# Patient Record
Sex: FEMALE | Race: WHITE | Employment: OTHER | URBAN - METROPOLITAN AREA
[De-identification: names, ages, dates, MRNs, and addresses within clinical notes are randomized per-mention and may not be internally consistent; named-entity substitution may affect disease eponyms.]

---

## 2021-03-02 DIAGNOSIS — Z11.59 ENCOUNTER FOR SCREENING FOR OTHER VIRAL DISEASES: ICD-10-CM

## 2021-03-16 ENCOUNTER — ANESTHESIA EVENT (OUTPATIENT)
Dept: SURGERY | Facility: CLINIC | Age: 76
End: 2021-03-16
Payer: MEDICARE

## 2021-03-17 RX ORDER — SERTRALINE HYDROCHLORIDE 100 MG/1
100 TABLET, FILM COATED ORAL DAILY
COMMUNITY

## 2021-03-17 RX ORDER — OXYBUTYNIN CHLORIDE 10 MG/1
10 TABLET, EXTENDED RELEASE ORAL DAILY
COMMUNITY

## 2021-03-17 RX ORDER — ASCORBIC ACID 500 MG
500 TABLET ORAL DAILY
COMMUNITY

## 2021-03-17 RX ORDER — ERGOCALCIFEROL (VITAMIN D2) 10 MCG
2 TABLET ORAL DAILY
COMMUNITY

## 2021-03-17 RX ORDER — METOPROLOL TARTRATE 25 MG/1
25 TABLET, FILM COATED ORAL 2 TIMES DAILY
COMMUNITY

## 2021-03-17 RX ORDER — SIMVASTATIN 40 MG
40 TABLET ORAL AT BEDTIME
COMMUNITY

## 2021-03-17 RX ORDER — LOSARTAN POTASSIUM 50 MG/1
50 TABLET ORAL DAILY
COMMUNITY

## 2021-03-17 RX ORDER — LACTOBACILLUS RHAMNOSUS GG 10B CELL
1 CAPSULE ORAL DAILY
COMMUNITY

## 2021-03-17 RX ORDER — WARFARIN SODIUM 4 MG/1
4 TABLET ORAL
COMMUNITY

## 2021-03-17 RX ORDER — AMOXICILLIN 500 MG/1
2000 TABLET, FILM COATED ORAL PRN
COMMUNITY

## 2021-03-17 RX ORDER — LOPERAMIDE HYDROCHLORIDE 2 MG/1
2 TABLET ORAL EVERY 4 HOURS PRN
COMMUNITY

## 2021-03-17 RX ORDER — NYSTATIN 100000 [USP'U]/G
POWDER TOPICAL PRN
COMMUNITY

## 2021-03-18 ENCOUNTER — HOSPITAL ENCOUNTER (OUTPATIENT)
Facility: CLINIC | Age: 76
Discharge: HOME | End: 2021-03-18
Attending: OPHTHALMOLOGY | Admitting: OPHTHALMOLOGY
Payer: MEDICARE

## 2021-03-18 ENCOUNTER — ANESTHESIA (OUTPATIENT)
Dept: SURGERY | Facility: CLINIC | Age: 76
End: 2021-03-18
Payer: MEDICARE

## 2021-03-18 VITALS
SYSTOLIC BLOOD PRESSURE: 139 MMHG | DIASTOLIC BLOOD PRESSURE: 67 MMHG | OXYGEN SATURATION: 94 % | RESPIRATION RATE: 13 BRPM | HEIGHT: 65 IN | HEART RATE: 56 BPM | TEMPERATURE: 98 F | WEIGHT: 269.4 LBS | BODY MASS INDEX: 44.89 KG/M2

## 2021-03-18 DIAGNOSIS — H02.831 DERMATOCHALASIS OF BOTH UPPER EYELIDS: Primary | ICD-10-CM

## 2021-03-18 DIAGNOSIS — H02.834 DERMATOCHALASIS OF BOTH UPPER EYELIDS: Primary | ICD-10-CM

## 2021-03-18 LAB — INR PPP: 1.01 (ref 0.86–1.14)

## 2021-03-18 PROCEDURE — 999N000141 HC STATISTIC PRE-PROCEDURE NURSING ASSESSMENT: Performed by: OPHTHALMOLOGY

## 2021-03-18 PROCEDURE — 250N000009 HC RX 250: Performed by: OPHTHALMOLOGY

## 2021-03-18 PROCEDURE — 710N000012 HC RECOVERY PHASE 2, PER MINUTE: Performed by: OPHTHALMOLOGY

## 2021-03-18 PROCEDURE — 250N000011 HC RX IP 250 OP 636: Performed by: NURSE ANESTHETIST, CERTIFIED REGISTERED

## 2021-03-18 PROCEDURE — 250N000009 HC RX 250: Performed by: NURSE ANESTHETIST, CERTIFIED REGISTERED

## 2021-03-18 PROCEDURE — 370N000017 HC ANESTHESIA TECHNICAL FEE, PER MIN: Performed by: OPHTHALMOLOGY

## 2021-03-18 PROCEDURE — 272N000001 HC OR GENERAL SUPPLY STERILE: Performed by: OPHTHALMOLOGY

## 2021-03-18 PROCEDURE — 258N000003 HC RX IP 258 OP 636: Performed by: ANESTHESIOLOGY

## 2021-03-18 PROCEDURE — 360N000076 HC SURGERY LEVEL 3, PER MIN: Performed by: OPHTHALMOLOGY

## 2021-03-18 PROCEDURE — 710N000009 HC RECOVERY PHASE 1, LEVEL 1, PER MIN: Performed by: OPHTHALMOLOGY

## 2021-03-18 PROCEDURE — 258N000003 HC RX IP 258 OP 636: Performed by: NURSE ANESTHETIST, CERTIFIED REGISTERED

## 2021-03-18 PROCEDURE — 85610 PROTHROMBIN TIME: CPT | Performed by: ANESTHESIOLOGY

## 2021-03-18 PROCEDURE — 36415 COLL VENOUS BLD VENIPUNCTURE: CPT | Performed by: ANESTHESIOLOGY

## 2021-03-18 RX ORDER — ONDANSETRON 2 MG/ML
INJECTION INTRAMUSCULAR; INTRAVENOUS PRN
Status: DISCONTINUED | OUTPATIENT
Start: 2021-03-18 | End: 2021-03-18

## 2021-03-18 RX ORDER — ERYTHROMYCIN 5 MG/G
OINTMENT OPHTHALMIC PRN
Status: DISCONTINUED | OUTPATIENT
Start: 2021-03-18 | End: 2021-03-18 | Stop reason: HOSPADM

## 2021-03-18 RX ORDER — NALOXONE HYDROCHLORIDE 0.4 MG/ML
0.4 INJECTION, SOLUTION INTRAMUSCULAR; INTRAVENOUS; SUBCUTANEOUS
Status: DISCONTINUED | OUTPATIENT
Start: 2021-03-18 | End: 2021-03-18 | Stop reason: HOSPADM

## 2021-03-18 RX ORDER — LIDOCAINE HYDROCHLORIDE 20 MG/ML
INJECTION, SOLUTION INFILTRATION; PERINEURAL PRN
Status: DISCONTINUED | OUTPATIENT
Start: 2021-03-18 | End: 2021-03-18

## 2021-03-18 RX ORDER — SODIUM CHLORIDE, SODIUM LACTATE, POTASSIUM CHLORIDE, CALCIUM CHLORIDE 600; 310; 30; 20 MG/100ML; MG/100ML; MG/100ML; MG/100ML
INJECTION, SOLUTION INTRAVENOUS CONTINUOUS
Status: DISCONTINUED | OUTPATIENT
Start: 2021-03-18 | End: 2021-03-18 | Stop reason: HOSPADM

## 2021-03-18 RX ORDER — MEPERIDINE HYDROCHLORIDE 25 MG/ML
12.5 INJECTION INTRAMUSCULAR; INTRAVENOUS; SUBCUTANEOUS
Status: DISCONTINUED | OUTPATIENT
Start: 2021-03-18 | End: 2021-03-18 | Stop reason: HOSPADM

## 2021-03-18 RX ORDER — ONDANSETRON 4 MG/1
4 TABLET, ORALLY DISINTEGRATING ORAL EVERY 30 MIN PRN
Status: DISCONTINUED | OUTPATIENT
Start: 2021-03-18 | End: 2021-03-18 | Stop reason: HOSPADM

## 2021-03-18 RX ORDER — HYDROMORPHONE HYDROCHLORIDE 1 MG/ML
.3-.5 INJECTION, SOLUTION INTRAMUSCULAR; INTRAVENOUS; SUBCUTANEOUS EVERY 10 MIN PRN
Status: DISCONTINUED | OUTPATIENT
Start: 2021-03-18 | End: 2021-03-18 | Stop reason: HOSPADM

## 2021-03-18 RX ORDER — ALBUTEROL SULFATE 0.83 MG/ML
2.5 SOLUTION RESPIRATORY (INHALATION) EVERY 4 HOURS PRN
Status: DISCONTINUED | OUTPATIENT
Start: 2021-03-18 | End: 2021-03-18 | Stop reason: HOSPADM

## 2021-03-18 RX ORDER — FENTANYL CITRATE 50 UG/ML
25-50 INJECTION, SOLUTION INTRAMUSCULAR; INTRAVENOUS
Status: CANCELLED | OUTPATIENT
Start: 2021-03-18

## 2021-03-18 RX ORDER — BUPIVACAINE HYDROCHLORIDE AND EPINEPHRINE 5; 5 MG/ML; UG/ML
INJECTION, SOLUTION EPIDURAL; INTRACAUDAL; PERINEURAL
Status: DISCONTINUED
Start: 2021-03-18 | End: 2021-03-18 | Stop reason: HOSPADM

## 2021-03-18 RX ORDER — ASPIRIN 81 MG/1
81 TABLET ORAL DAILY
COMMUNITY

## 2021-03-18 RX ORDER — METOPROLOL TARTRATE 1 MG/ML
1-2 INJECTION, SOLUTION INTRAVENOUS EVERY 5 MIN PRN
Status: DISCONTINUED | OUTPATIENT
Start: 2021-03-18 | End: 2021-03-18 | Stop reason: HOSPADM

## 2021-03-18 RX ORDER — PROPOFOL 10 MG/ML
INJECTION, EMULSION INTRAVENOUS PRN
Status: DISCONTINUED | OUTPATIENT
Start: 2021-03-18 | End: 2021-03-18

## 2021-03-18 RX ORDER — OXYCODONE HCL 5 MG/5 ML
5 SOLUTION, ORAL ORAL EVERY 4 HOURS PRN
Status: DISCONTINUED | OUTPATIENT
Start: 2021-03-18 | End: 2021-03-18 | Stop reason: HOSPADM

## 2021-03-18 RX ORDER — FENTANYL CITRATE 50 UG/ML
25-50 INJECTION, SOLUTION INTRAMUSCULAR; INTRAVENOUS
Status: DISCONTINUED | OUTPATIENT
Start: 2021-03-18 | End: 2021-03-18 | Stop reason: HOSPADM

## 2021-03-18 RX ORDER — NALOXONE HYDROCHLORIDE 0.4 MG/ML
0.2 INJECTION, SOLUTION INTRAMUSCULAR; INTRAVENOUS; SUBCUTANEOUS
Status: DISCONTINUED | OUTPATIENT
Start: 2021-03-18 | End: 2021-03-18 | Stop reason: HOSPADM

## 2021-03-18 RX ORDER — TETRACAINE HYDROCHLORIDE 5 MG/ML
SOLUTION OPHTHALMIC PRN
Status: DISCONTINUED | OUTPATIENT
Start: 2021-03-18 | End: 2021-03-18 | Stop reason: HOSPADM

## 2021-03-18 RX ORDER — TETRACAINE HYDROCHLORIDE 5 MG/ML
SOLUTION OPHTHALMIC
Status: DISCONTINUED
Start: 2021-03-18 | End: 2021-03-18 | Stop reason: HOSPADM

## 2021-03-18 RX ORDER — FENTANYL CITRATE 50 UG/ML
INJECTION, SOLUTION INTRAMUSCULAR; INTRAVENOUS PRN
Status: DISCONTINUED | OUTPATIENT
Start: 2021-03-18 | End: 2021-03-18

## 2021-03-18 RX ORDER — HYDRALAZINE HYDROCHLORIDE 20 MG/ML
2.5-5 INJECTION INTRAMUSCULAR; INTRAVENOUS EVERY 10 MIN PRN
Status: DISCONTINUED | OUTPATIENT
Start: 2021-03-18 | End: 2021-03-18 | Stop reason: HOSPADM

## 2021-03-18 RX ORDER — ERYTHROMYCIN 5 MG/G
OINTMENT OPHTHALMIC
Status: DISCONTINUED
Start: 2021-03-18 | End: 2021-03-18 | Stop reason: HOSPADM

## 2021-03-18 RX ORDER — ONDANSETRON 2 MG/ML
4 INJECTION INTRAMUSCULAR; INTRAVENOUS EVERY 30 MIN PRN
Status: DISCONTINUED | OUTPATIENT
Start: 2021-03-18 | End: 2021-03-18 | Stop reason: HOSPADM

## 2021-03-18 RX ADMIN — PROPOFOL 30 MG: 10 INJECTION, EMULSION INTRAVENOUS at 07:55

## 2021-03-18 RX ADMIN — PROPOFOL 20 MG: 10 INJECTION, EMULSION INTRAVENOUS at 07:57

## 2021-03-18 RX ADMIN — FENTANYL CITRATE 25 MCG: 50 INJECTION, SOLUTION INTRAMUSCULAR; INTRAVENOUS at 07:55

## 2021-03-18 RX ADMIN — ONDANSETRON 4 MG: 2 INJECTION INTRAMUSCULAR; INTRAVENOUS at 07:55

## 2021-03-18 RX ADMIN — DEXMEDETOMIDINE HYDROCHLORIDE 8 MCG: 100 INJECTION, SOLUTION INTRAVENOUS at 07:53

## 2021-03-18 RX ADMIN — SODIUM CHLORIDE, POTASSIUM CHLORIDE, SODIUM LACTATE AND CALCIUM CHLORIDE: 600; 310; 30; 20 INJECTION, SOLUTION INTRAVENOUS at 07:47

## 2021-03-18 RX ADMIN — LIDOCAINE HYDROCHLORIDE 40 MG: 20 INJECTION, SOLUTION INFILTRATION; PERINEURAL at 07:55

## 2021-03-18 ASSESSMENT — MIFFLIN-ST. JEOR: SCORE: 1722.87

## 2021-03-18 NOTE — OP NOTE
[facility]   Operative Note    Pre-operative diagnosis: Dermatochalasis of right upper eyelid [H02.831]  Dermatochalasis of left upper eyelid [H02.834]   Post-operative diagnosis Same     Procedure: Procedure(s):  BLEPHAROPLASTY BILATERAL UPPER LIDS   Surgeon(s): Surgeon(s) and Role:     * Femi Lamb MD - Primary   Estimated blood loss: 2 mL    Specimens: ID Type Source Tests Collected by Time Destination   1 : BILATERAL BLEPHAROPLASTY SKIN Tissue Upper Eyelid OR DOCUMENTATION ONLY Femi Lamb MD 3/18/2021  8:11 AM       Findings: None     Description of procedure:     The patient presented with a chief complaint of droopy upper eyelids. On examination, the patient was found to have bilateral upper eyelid dermatochalasis. Risks, benefits, and alternatives were discussed. Potential complications were also discussed. The patient understood and wished to proceed.    After obtaining informed consent the patient was brought to the ambulatory surgery center where the patient was placed on the surgical table in the supine position. Using IV standby local anesthetic was infiltrated. A mixture of 2% lidocaine with epinephrine and 0.5% Marcaine with epinephrine was used. Patient was prepped and draped in the usual sterile fashion.    The procedures were performed in a symmetric fashion and will be described for one side. An incision was made in the upper eyelid with a #15 Bard-Ricky blade. The ellipse was determined using the caliper technique leaving behind approximately 20 mm of tissue. This was marked with a surgical marking pen prior to surgery. Skin and some muscle were removed with the [place] tip needle. All hemostasis was performed with this device during the procedure. The skin was closed with a running 6-0 Plain suture.    The patient's face was cleaned and antibiotic ointment was placed onto the wound. The patient tolerated the procedure well and was taken to the recovery  area in good condition. Following the recovery room protocol, the patient was discharged in the presence of a responsible adult.

## 2021-03-18 NOTE — ANESTHESIA PREPROCEDURE EVALUATION
Anesthesia Pre-Procedure Evaluation    Patient: Irish Payton   MRN: [id number] : 1945        Preoperative Diagnosis: Dermatochalasis of right upper eyelid [H02.831]  Dermatochalasis of left upper eyelid [H02.834]   Procedure : Procedure(s):  BLEPHAROPLASTY BILATERAL UPPER LIDS     Past Medical History:   Diagnosis Date     Breast cancer (H)      CVA (cerebral vascular accident) (H)      Depressive disorder      Gait disorder     r/t stroke     Hemiplegia and hemiparesis following cerebral infarction affecting left dominant side (H)      History of fall      Hyperlipidemia      Incontinence      Presence of inferior vena cava filter      Pulmonary embolism (H)      Sleep apnea       Past Surgical History:   Procedure Laterality Date     BACK SURGERY      disectomy     BREAST SURGERY      breast biopsy     GENITOURINARY SURGERY      repair bladder     ORTHOPEDIC SURGERY      arthroplasty total knee replacement, revision total knee     ORTHOPEDIC SURGERY      ORIF     vena cava filter replacement        Allergies   Allergen Reactions     Hydrocodone GI Disturbance     Hydrocodone-Cpm-Pseudoephed     Lisinopril Cough     Ciprofloxacin Rash     Heparin Rash     Nickel Rash      Social History     Tobacco Use     Smoking status: Not on file   Substance Use Topics     Alcohol use: Not on file      Wt Readings from Last 1 Encounters:   No data found for Wt        Anesthesia Evaluation   Pt has had prior anesthetic.     No history of anesthetic complications       ROS/MED HX  ENT/Pulmonary:     (+) sleep apnea,     Neurologic:     (+) CVA, with deficits, - hemiplegia.     Cardiovascular:       METS/Exercise Tolerance:     Hematologic:     (+) History of blood clots,     Musculoskeletal:       GI/Hepatic:     (+) Inflammatory bowel disease,     Renal/Genitourinary:       Endo:       Psychiatric/Substance Use:       Infectious Disease:       Malignancy:   (+) Malignancy, History of Breast.    Other:                OUTSIDE LABS:  CBC:   Lab Results   Component Value Date    HGB 11.0 (L) 07/22/2010    HGB 11.9 07/21/2010     07/22/2010     BMP:   Lab Results   Component Value Date     07/19/2010    POTASSIUM 4.1 07/19/2010    CHLORIDE 101 07/19/2010    CO2 28 07/19/2010    BUN 16 07/19/2010    CR 1.01 07/19/2010    GLC 78 07/19/2010     COAGS:   Lab Results   Component Value Date    PTT 39 (H) 07/19/2010    INR 1.37 (H) 07/23/2010     POC: No results found for: BGM, HCG, HCGS  HEPATIC: No results found for: ALBUMIN, PROTTOTAL, ALT, AST, GGT, ALKPHOS, BILITOTAL, BILIDIRECT, DORCAS  OTHER:   Lab Results   Component Value Date    LUIZ 9.2 07/19/2010       Anesthesia Plan    ASA Status:  3   NPO Status:  NPO Appropriate    Anesthesia Type: MAC.              Consents    Anesthesia Plan(s) and associated risks, benefits, and realistic alternatives discussed. Questions answered and patient/representative(s) expressed understanding.     - Discussed with:  Patient         Postoperative Care    Pain management: Multi-modal analgesia.   PONV prophylaxis: Ondansetron (or other 5HT-3)     Comments:                Yessica Vincent MD, MD

## 2021-03-18 NOTE — BRIEF OP NOTE
[facility]    Brief Operative Note    Pre-operative diagnosis: Dermatochalasis of right upper eyelid [H02.831]  Dermatochalasis of left upper eyelid [H02.834]  Post-operative diagnosis Same as pre-operative diagnosis    Procedure: Procedure(s):  BLEPHAROPLASTY BILATERAL UPPER LIDS  Surgeon: Surgeon(s) and Role:     * Femi Lamb MD - Primary  Anesthesia: Monitor Anesthesia Care   Estimated blood loss: 2 mL  Drains: None  Specimens:   ID Type Source Tests Collected by Time Destination   1 : BILATERAL BLEPHAROPLASTY SKIN Tissue Upper Eyelid OR DOCUMENTATION ONLY Femi Lamb MD 3/18/2021  8:11 AM      Findings:   None.  Complications: None.  Implants: * No implants in log *

## 2021-03-18 NOTE — DISCHARGE INSTRUCTIONS
YOU MAY RESUME WARFARIN IN 3 DAYS.    Same Day Surgery Discharge Instructions for  Sedation and General Anesthesia       It's not unusual to feel dizzy, light-headed or faint for up to 24 hours after surgery or while taking pain medication.  If you have these symptoms: sit for a few minutes before standing and have someone assist you when you get up to walk or use the bathroom.      You should rest and relax for the next 24 hours. We recommend you make arrangements to have an adult stay with you for at least 24 hours after your discharge.  Avoid hazardous and strenuous activity.      DO NOT DRIVE any vehicle or operate mechanical equipment for 24 hours following the end of your surgery.  Even though you may feel normal, your reactions may be affected by the medication you have received.      Do not drink alcoholic beverages for 24 hours following surgery.       Slowly progress to your regular diet as you feel able. It's not unusual to feel nauseated and/or vomit after receiving anesthesia.  If you develop these symptoms, drink clear liquids (apple juice, ginger ale, broth, 7-up, etc. ) until you feel better.  If your nausea and vomiting persists for 24 hours, please notify your surgeon.        All narcotic pain medications, along with inactivity and anesthesia, can cause constipation. Drinking plenty of liquids and increasing fiber intake will help.      For any questions of a medical nature, call your surgeon.      Do not make important decisions for 24 hours.      If you had general anesthesia, you may have a sore throat for a couple of days related to the breathing tube used during surgery.  You may use Cepacol lozenges to help with this discomfort.  If it worsens or if you develop a fever, contact your surgeon.       If you feel your pain is not well managed with the pain medications prescribed by your surgeon, please contact your surgeon's office to let them know so they can address your concerns.       CoVid  19 Information    We want to give you information regarding Covid. Please consult your primary care provider with any questions you might have.     Patient who have symptoms (cough, fever, or shortness of breath), need to isolate for 7 days from when symptoms started OR 72 hours after fever resolves (without fever reducing medications) AND improvement of respiratory symptoms (whichever is longer).      Isolate yourself at home (in own room/own bathroom if possible)    Do Not allow any visitors    Do Not go to work or school    Do Not go to Denominational,  centers, shopping, or other public places.    Do Not shake hands.    Avoid close and intimate contact with others (hugging, kissing).    Follow CDC recommendations for household cleaning of frequently touched services.     After the initial 7 days, continue to isolate yourself from household members as much as possible. To continue decrease the risk of community spread and exposure, you and any members of your household should limit activities in public for 14 days after starting home isolation.     You can reference the following CDC link for helpful home isolation/care tips:  https://www.cdc.gov/coronavirus/2019-ncov/downloads/10Things.pdf    Protect Others:    Cover Your Mouth and Nose with a mask, disposable tissue or wash cloth to avoid spreading germs to others.    Wash your hands and face frequently with soap and water    Call Your Primary Doctor If: Breathing difficulty develops or you become worse.    For more information about COVID19 and options for caring for yourself at home, please visit the CDC website at https://www.cdc.gov/coronavirus/2019-ncov/about/steps-when-sick.html  For more options for care at [facility], please visit our website at [email or web address]        POST-SURGERY INSTRUCTIONS - STANDARD  DR SERGIO GUEVARA    NAME:       :          POST-OPERATIVE APPOINTMENTS:       DAYS ONE & TWO  POST-SURGERY  [Please follow these instructions for 48 hours after surgery]    Bruising and Swelling: Remember that swelling and bruising can increase for the first 24-48 hours. While you should expect swelling, keeping swelling to a minimum will optimize your healing results.    Positioning: Remain in a reclined position (30-45 degrees) for the first 48 hours after surgery. Keeping the head above the heart is important to reduce swelling and shortens recovery time.    Activity: For the first 48 hours, we recommend you keep your activity to a minimum, only getting up to use the restroom and sitting up for meals. Keeping the heart rate and blood pressure low will reduce swelling and bruising.    Cold Compresses: Put 4x4 gauze pads (do not need to be sterile) in a bowl of cold tap water and keep in the refrigerator. Remove from fridge, place ice cubes into bowl of water to help keep cool and place next to you for easy access. When ready to use, wring out gauze pad and place one over each operative eye. Leave in place for 15 minutes on, then 15 minutes off for 48 hours. (Goal: apply cool for duration of 15 minutes, if your gauze is warming up before 15 minutes, replace warm gauze with new cool gauze to complete the 15 minute duration). You do not have to use them at night. Use new gauze each time. Cold gauze pads help soothe pain and itching and keep swelling to a minimum.   (Do NOT use ice packs as these are too cold and too heavy for the sensitive skin around the eyes.)      Preventing Infection:  Use the prescribed ophthalmic ointment over the surgical site and inside the eye (only when directed for inside the eye). Apply the ointment with a Q-tip. Use a rolling motion to cover any exposed stitches. If surgery is done on inside of the eyelid and instructed by doctor's office place a small dab of the ointment about the size of a grain of rice, in the inner corner of the eye. The heat of the eye and blinking motion  will melt the ointment and draw it across the whole eye. It is normal for the vision to be blurry while using the ointment as it is a thick consistency. Use the ointment 4 times a day: breakfast, lunch, dinner, and bedtime, for 7 days.    Eye drops: Use preservative-free lubricating eye drops at least 4 times a day for 48 hours, increase frequency as needed for your comfort. These drops can be used as often as you like to alleviate any dry eye, itchiness, foreign-body sensation, ginny feeling, etc. Avoid using lubricating eye drops with preservatives, as they can irritate the eye after surgery.     Itch: Surgical site itch is a normal post-operative expectation. Cold compresses and ointment can help. Over the counter diphenhydramine (Benadryl) taken as the box directed can also assist.    Pain Medication: Pain after this procedure is generally well controlled with extra-strength Acetaminophen (Tylenol). Take Acetaminophen as directed. Do not exceed the acetaminophen maximum total daily dose (4000 mg/day). Do not take more than one product that contains acetaminophen at any given time. Examples of prescription products that contain acetaminophen include hydrocodone with acetaminophen (Vicodin), oxycodone with acetaminophen (Percocet) and acetaminophen with codeine (Tylenol #3). Avoid other pain medications that could cause bleeding such as aspirin, Ibuprofen (Advil) and Naproxen (Aleve) unless otherwise directed by your doctor.               (Continued on reverse)  Diet: For the first two days after surgery, avoid foods that require excessive chewing, such as steak or bagels. Excessive chewing can stress the sutures.  No alcohol for 24 hours post-surgery. The combination of anesthesia and alcohol may cause nausea and vomiting.     Showering: You may resume showering 1 day after your surgery. It is okay for the surgical site to get wet but avoid direct forceful pressure from the shower touching the area.  A clean  wound is a happy wound, so please make sure to keep the surgical site clean and free of dried drainage. We recommend using a Q-tip dipped in warm water to clean around the eyes. Make sure to use a gentle rolling motion to avoid disrupting the sutures and incisions.      Arnica (optional): Resume taking Arnica for 4 days following your surgery. Take as directed on bottle.    WHAT YOU CAN EXPECT  Dry eyes (for about one month)  Itchiness (in and around your stitches)  Tightness (from swelling)  Red tinted tears (for the first few days)  Blurry vision (from the ointment and dry eyes)  Bruising (typically lasts 2-3 weeks)  One side more swollen or bruised than the other         WHAT YOU SHOULD NOT EXPERIENCE  Severe pain  Severe bleeding  Intolerable itching not relieved by cool compresses    These symptoms are rare, but if you do experience them, call our office at [phone].  Someone is available 24 hours a day. If it is after business hours, you will reach an answering service that will page the doctor on call.     FOR TWO WEEKS POST-SURGERY  Continue using preservative free lubricating drops 4 times a day or more as needed for two weeks.   Use cold compresses as needed for your comfort for up to 10 days after surgery.  Do not use Aspirin, Ibuprofen (Advil), Naproxen (Aleve) or other anticoagulants unless otherwise directed by your physician. Resume vitamins and supplements 7 days after surgery.  Keep swelling to a minimum:  -Avoid activity that will elevate your heart rate such as gym activity, running, climbing stairs, etc.   (It is okay to take a leisurely paced walk starting day 3 after surgery)  -Do not lift objects greater than 10 pounds.  -Do not bend down at the waist to lift objects, golf, garden,  children/pets.  To reduce the risk of infection:  -Do not wear soft contact lenses for 2 weeks. Do not use hard contacts lenses for a period of 4 weeks.  -Do not wear makeup anywhere near the surgical  site, wash hands often.  Minimize sun exposure to surgical site for 1 year minimum. Seek shade during peak hours (10 AM - 4 PM), wear a hat (a wide brim hat that protects your full face), UV-absorbent sunglasses (block both UVA and UVB rays), use sunscreen (SPF 30+, as directed on bottle).

## 2021-03-18 NOTE — ANESTHESIA CARE TRANSFER NOTE
Patient: Irish Payton    Procedure(s):  BLEPHAROPLASTY BILATERAL UPPER LIDS    Diagnosis: Dermatochalasis of right upper eyelid [H02.831]  Dermatochalasis of left upper eyelid [H02.834]  Diagnosis Additional Information: No value filed.    Anesthesia Type:   MAC     Note:    Oropharynx: oropharynx clear of all foreign objects  Level of Consciousness: awake  Oxygen Supplementation: room air    Independent Airway: airway patency satisfactory and stable  Dentition: dentition unchanged  Vital Signs Stable: post-procedure vital signs reviewed and stable  Report to RN Given: handoff report given  Patient transferred to: PACU  Comments: To [facility] PACU: Awake/alert, VSS  Report to RN  Handoff Report: Identifed the Patient, Identified the Reponsible Provider, Reviewed the pertinent medical history, Discussed the surgical course, Reviewed Intra-OP anesthesia mangement and issues during anesthesia, Set expectations for post-procedure period and Allowed opportunity for questions and acknowledgement of understanding      Vitals: (Last set prior to Anesthesia Care Transfer)  CRNA VITALS  3/18/2021 0803 - 3/18/2021 0840      3/18/2021             Resp Rate (set):  10        Electronically Signed By: CHLOÉ Michel CRNA  March 18, 2021  8:40 AM

## 2021-03-18 NOTE — ANESTHESIA POSTPROCEDURE EVALUATION
Patient: Irish Payton    Procedure(s):  BLEPHAROPLASTY BILATERAL UPPER LIDS    Diagnosis:Dermatochalasis of right upper eyelid [H02.831]  Dermatochalasis of left upper eyelid [H02.834]  Diagnosis Additional Information: No value filed.    Anesthesia Type:  MAC    Note:     Postop Pain Control: Uneventful            Sign Out: Well controlled pain   PONV: No   Neuro/Psych: Uneventful            Sign Out: Acceptable/Baseline neuro status   Airway/Respiratory: Uneventful            Sign Out: Acceptable/Baseline resp. status   CV/Hemodynamics: Uneventful            Sign Out: Acceptable CV status   Other NRE: NONE   DID A NON-ROUTINE EVENT OCCUR? No         Last vitals:  Vitals:    03/18/21 0919 03/18/21 0920 03/18/21 0922   BP: (!) 141/70  139/67   Pulse: 56     Resp: 13     Temp:      SpO2: 92% 93% 94%       Last vitals prior to Anesthesia Care Transfer:  CRNA VITALS  3/18/2021 0803 - 3/18/2021 0903      3/18/2021             NIBP:  149/54    Pulse:  56    SpO2:  95 %          Electronically Signed By: Yessica Vincent MD, MD  March 18, 2021  1:00 PM

## (undated) DEVICE — SOL WATER IRRIG 1000ML BOTTLE 2F7114

## (undated) DEVICE — SOL NACL 0.9% IRRIG 1000ML BOTTLE 2F7124

## (undated) DEVICE — ESU PENCIL W/SMOKE EVAC CVPLP2000

## (undated) DEVICE — EYE PREP BETADINE 5% SOLUTION 30ML 0065-0411-30

## (undated) DEVICE — GLOVE PROTEXIS MICRO 6.5  2D73PM65

## (undated) DEVICE — ESU NDL COLORADO MICRO 3CM STR N103A

## (undated) DEVICE — SU PLAIN 6-0 G-1DA 18" 770G

## (undated) DEVICE — NDL 27GA 0.5" 305109

## (undated) DEVICE — PACK OCULOPLATIC SEN15OCFSD

## (undated) DEVICE — LINEN TOWEL PACK X5 5464

## (undated) DEVICE — TUBING SUCTION 12"X1/4" N612

## (undated) DEVICE — PEN MARKING SKIN VISIMARK 1424SR

## (undated) DEVICE — DRSG GAUZE 4X4" 3033

## (undated) RX ORDER — LIDOCAINE HYDROCHLORIDE 20 MG/ML
INJECTION, SOLUTION EPIDURAL; INFILTRATION; INTRACAUDAL; PERINEURAL
Status: DISPENSED
Start: 2021-03-18

## (undated) RX ORDER — ONDANSETRON 2 MG/ML
INJECTION INTRAMUSCULAR; INTRAVENOUS
Status: DISPENSED
Start: 2021-03-18

## (undated) RX ORDER — FENTANYL CITRATE 50 UG/ML
INJECTION, SOLUTION INTRAMUSCULAR; INTRAVENOUS
Status: DISPENSED
Start: 2021-03-18